# Patient Record
Sex: FEMALE | Race: WHITE | Employment: STUDENT | URBAN - METROPOLITAN AREA
[De-identification: names, ages, dates, MRNs, and addresses within clinical notes are randomized per-mention and may not be internally consistent; named-entity substitution may affect disease eponyms.]

---
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
## 2020-01-29 ENCOUNTER — TELEPHONE (OUTPATIENT)
Dept: GASTROENTEROLOGY | Facility: CLINIC | Age: 20
End: 2020-01-29

## 2020-01-30 ENCOUNTER — TELEPHONE (OUTPATIENT)
Dept: GASTROENTEROLOGY | Facility: CLINIC | Age: 20
End: 2020-01-30

## 2020-01-30 RX ORDER — AMOXICILLIN 250 MG/5ML
750 POWDER, FOR SUSPENSION ORAL 4 TIMES DAILY
Qty: 460 ML | Refills: 0 | Status: SHIPPED | OUTPATIENT
Start: 2020-01-30 | End: 2020-06-17

## 2020-01-30 RX ORDER — LANSOPRAZOLE 30 MG/1
30 CAPSULE, DELAYED RELEASE ORAL 2 TIMES DAILY
Qty: 10 CAPSULE | Refills: 0 | Status: CANCELLED | OUTPATIENT
Start: 2020-01-30

## 2020-01-30 RX ORDER — AMOXICILLIN 250 MG/5ML
750 POWDER, FOR SUSPENSION ORAL 4 TIMES DAILY
Qty: 460 ML | Refills: 0 | Status: CANCELLED | OUTPATIENT
Start: 2020-01-30

## 2020-01-30 RX ORDER — LANSOPRAZOLE 30 MG/1
30 CAPSULE, DELAYED RELEASE ORAL 2 TIMES DAILY
Qty: 28 CAPSULE | Refills: 0 | Status: SHIPPED | OUTPATIENT
Start: 2020-01-30 | End: 2020-06-17

## 2020-01-30 NOTE — TELEPHONE ENCOUNTER
Spoke with pt. Amoxicillin/lansoprazole therapy elected to treat h. Pylori due to side effects of rifabutin.   She states she took the amoxicillin for 2 days but ended up being treated for a kidney infection with different antibiotics and was told to hold

## 2020-01-30 NOTE — TELEPHONE ENCOUNTER
Nursing: Just to clarify the medication regimen. I see the Dr. So  prescribed lansoprazole 30 mg twice daily and amoxicillin 750 mg 4 times daily in suspension form. Did the patient stop both medications? They need to be taken together.   So if

## 2020-01-30 NOTE — TELEPHONE ENCOUNTER
Spoke with the patient. She confirmed that she stopped both lansoprazole and amoxicillin as written below after two days of therapy. Has not restarted either medication.      Requesting new prescriptions for both medications sent to pharmacy listed in chart

## 2020-03-19 ENCOUNTER — TELEPHONE (OUTPATIENT)
Dept: GASTROENTEROLOGY | Facility: CLINIC | Age: 20
End: 2020-03-19

## 2020-03-25 ENCOUNTER — APPOINTMENT (OUTPATIENT)
Dept: LAB | Facility: HOSPITAL | Age: 20
End: 2020-03-25
Attending: INTERNAL MEDICINE
Payer: MEDICAID

## 2020-03-25 DIAGNOSIS — A04.8 H. PYLORI INFECTION: ICD-10-CM

## 2020-03-25 PROCEDURE — 83013 H PYLORI (C-13) BREATH: CPT

## 2020-03-27 LAB — H. PYLORI BREATH TEST: POSITIVE

## 2020-05-11 ENCOUNTER — PATIENT MESSAGE (OUTPATIENT)
Dept: GASTROENTEROLOGY | Facility: CLINIC | Age: 20
End: 2020-05-11

## 2020-05-13 ENCOUNTER — TELEPHONE (OUTPATIENT)
Dept: GASTROENTEROLOGY | Facility: CLINIC | Age: 20
End: 2020-05-13

## 2020-05-13 DIAGNOSIS — R10.13 DYSPEPSIA: Primary | ICD-10-CM

## 2020-05-13 DIAGNOSIS — R11.10 VOMITING, INTRACTABILITY OF VOMITING NOT SPECIFIED, PRESENCE OF NAUSEA NOT SPECIFIED, UNSPECIFIED VOMITING TYPE: ICD-10-CM

## 2020-05-13 DIAGNOSIS — Z86.19 HISTORY OF HELICOBACTER PYLORI INFECTION: ICD-10-CM

## 2020-05-13 NOTE — TELEPHONE ENCOUNTER
May 12, 2020              3:51 PM   Heather Beaver MD routed this conversation to [facility] Clinical Staff   Heather Beaver MD           3:51 PM   Note      You may arrange an endoscopy for refractory dyspepsia, vomiting and H. pylori infection. this is Radames Mortimer i hope your family and yourself doing well and staying safe during these times. i was just contacting you to see if there was any update on when the scope would be able to be scheduled.  my throwing up has increased a bit and that might have

## 2020-05-13 NOTE — TELEPHONE ENCOUNTER
Will route to Gi Schedulers : see below order     \"You may arrange an endoscopy for refractory dyspepsia, vomiting and H. pylori infection. Monitored anesthesia care. \"    Thank you

## 2020-05-15 NOTE — TELEPHONE ENCOUNTER
JERSONM to schedule procedure. [facility] ext [phone] for scheduling.

## 2020-05-16 NOTE — TELEPHONE ENCOUNTER
Scheduled for:  EGD 37882  Provider Name:  Dr. Ene Riddle  Date:  5/19/20  Location:    [facility]  Sedation:  MAC  Time:  1200 (pt is aware to arrive at 1100)   Prep:  NPO after midnight, sent via Mychart  Meds/Allergies Reconciled?:  Physician reviewed   Diag

## 2020-05-19 ENCOUNTER — HOSPITAL ENCOUNTER (OUTPATIENT)
Facility: HOSPITAL | Age: 20
Setting detail: HOSPITAL OUTPATIENT SURGERY
Discharge: HOME | End: 2020-05-19
Attending: INTERNAL MEDICINE | Admitting: INTERNAL MEDICINE
Payer: MEDICAID

## 2020-05-19 ENCOUNTER — ANESTHESIA EVENT (OUTPATIENT)
Dept: ENDOSCOPY | Facility: HOSPITAL | Age: 20
End: 2020-05-19
Payer: MEDICAID

## 2020-05-19 ENCOUNTER — ANESTHESIA (OUTPATIENT)
Dept: ENDOSCOPY | Facility: HOSPITAL | Age: 20
End: 2020-05-19
Payer: MEDICAID

## 2020-05-19 DIAGNOSIS — Z86.19 HISTORY OF HELICOBACTER PYLORI INFECTION: ICD-10-CM

## 2020-05-19 DIAGNOSIS — R11.10 VOMITING, INTRACTABILITY OF VOMITING NOT SPECIFIED, PRESENCE OF NAUSEA NOT SPECIFIED, UNSPECIFIED VOMITING TYPE: ICD-10-CM

## 2020-05-19 DIAGNOSIS — R10.13 DYSPEPSIA: ICD-10-CM

## 2020-05-19 PROCEDURE — 0DB68ZX EXCISION OF STOMACH, VIA NATURAL OR ARTIFICIAL OPENING ENDOSCOPIC, DIAGNOSTIC: ICD-10-PCS | Performed by: INTERNAL MEDICINE

## 2020-05-19 PROCEDURE — 43239 EGD BIOPSY SINGLE/MULTIPLE: CPT | Performed by: INTERNAL MEDICINE

## 2020-05-19 RX ORDER — NALOXONE HYDROCHLORIDE 0.4 MG/ML
80 INJECTION, SOLUTION INTRAMUSCULAR; INTRAVENOUS; SUBCUTANEOUS AS NEEDED
Status: DISCONTINUED | OUTPATIENT
Start: 2020-05-19 | End: 2020-05-19

## 2020-05-19 RX ORDER — SODIUM CHLORIDE, SODIUM LACTATE, POTASSIUM CHLORIDE, CALCIUM CHLORIDE 600; 310; 30; 20 MG/100ML; MG/100ML; MG/100ML; MG/100ML
INJECTION, SOLUTION INTRAVENOUS CONTINUOUS
Status: DISCONTINUED | OUTPATIENT
Start: 2020-05-19 | End: 2020-05-19

## 2020-05-19 RX ADMIN — SODIUM CHLORIDE, SODIUM LACTATE, POTASSIUM CHLORIDE, CALCIUM CHLORIDE: 600; 310; 30; 20 INJECTION, SOLUTION INTRAVENOUS at 14:19:00

## 2020-05-19 NOTE — OPERATIVE REPORT
[facility] Endoscopy Report      Date of Procedure:  05/19/20        Preoperative Diagnosis:  1. Recurrent vomiting  2.   Recalcitrant H. pylori infection post multiple antibiotic courses      Postoperative Diagnosis:  Normal examination of taken of the gastric antrum and gastric body and placed in separate containers. The duodenal bulb and post bulbar region were normal.      Impression:  Normal examination of the upper digestive tract    Recommendations:  1.   Continue symptomatic treatment

## 2020-05-19 NOTE — ANESTHESIA POSTPROCEDURE EVALUATION
Patient: Crystal León    Procedure Summary     Date:  05/19/20 Room / Location:  [facility] ENDOSCOPY 04 / [facility] ENDOSCOPY    Anesthesia Start:  9619 Anesthesia Stop:      Procedure:  ESOPHAGOGASTRODUODENOSCOPY (EGD) (N/A ) Diagnosis:       Dyspepsia      Vomi

## 2020-05-19 NOTE — H&P
History & Physical Examination    Patient Name: Dylan Mccullough  MRN: [id number]  CSN: [id number]  YOB: 2000    Diagnosis: Recurrent vomiting and recalcitrant H. Pylori infection post multiple antibiotic treatment courses.       lansoprazole use: Yes      Frequency: 2-4 times a month      SYSTEM Check if Review is Normal Check if Physical Exam is Normal If not normal, please explain:   RICHARD Adela.Blowers ] [ Jace Flowers  Adela.Blowers ] [ Buffy Lee Adela.Blowers ] [ Jose Sierra Adela.Blowers ] [ Scot Anderson Adela.Blowers ] [ Debby Pierre

## 2020-05-19 NOTE — ANESTHESIA PREPROCEDURE EVALUATION
Anesthesia PreOp Note    HPI:     Channing Liu is a 23year old female who presents for preoperative consultation requested by: Cornel Mancini MD    Date of Surgery: 5/19/2020    Procedure(s):  ESOPHAGOGASTRODUODENOSCOPY (EGD)  Indication: Dysp mg total) by mouth every 8 (eight) hours as needed for Pain., Disp: 20 tablet, Rfl: 0, Taking      lactated ringers infusion, , Intravenous, Continuous, Antonio An MD    No current [facility]-ordered outpatient medications on file.       No Known Aller labs reviewed. Lab Results   Component Value Date    URINEPREG Negative 05/19/2020             Vital Signs: Body mass index is 24.04 kg/m². height is 1.473 m (4' 10\") and weight is 52.2 kg (115 lb). Her temperature is 97.7 °F (36.5 °C).  Her blood pres

## 2020-05-19 NOTE — OR NURSING
Gastric biopsies in a sterile cup with normal saline for H.pylori culture and sensitivity is a Send Out to [facility]. Specimen was hand delivered to [facility] and Dr Fredy Garcia for processing.

## 2020-05-20 VITALS
DIASTOLIC BLOOD PRESSURE: 79 MMHG | OXYGEN SATURATION: 100 % | SYSTOLIC BLOOD PRESSURE: 112 MMHG | HEART RATE: 57 BPM | WEIGHT: 115 LBS | RESPIRATION RATE: 22 BRPM | BODY MASS INDEX: 24.14 KG/M2 | TEMPERATURE: 98 F | HEIGHT: 58 IN

## 2020-06-16 ENCOUNTER — TELEPHONE (OUTPATIENT)
Dept: FAMILY MEDICINE CLINIC | Facility: CLINIC | Age: 20
End: 2020-06-16

## 2020-06-16 NOTE — TELEPHONE ENCOUNTER
Patient calling to make a follow up appointment. Patient states she has H-pylori and anxiety. This has been going on with patient for 10 years. Patient states she has no new symptoms, had a scope last month with Dr. Aimee Severino.  Follow up appointment mad

## 2020-06-17 NOTE — PROGRESS NOTES
HPI    Pt presents with mother for anxiety. Has had h pylorin infections that keep reoccurring for the past 10 years-is being treated by Dr Sánchez Angelo. Has long history of nausea with vomiting. Lives with sister in an apartment for the past 3 years.  Sis 06/17/20  1606   BP: 127/81   Pulse: 90   Weight: 112 lb (50.8 kg)   Height: 4' 10\" (1.473 m)     Body mass index is 23.41 kg/m².     HPV Vaccines(1 - Female 2-dose series) due on 09/02/2011  Annual Physical due on 10/10/2019  Annual Depression Screen due Talks on phone: Not on file        Gets together: Not on file        Attends Buddhist service: Not on file        Active member of club or organization: Not on file        Attends meetings of clubs or organizations: Not on file        Relationship status: agrees to start with low dose of buspar-advised how to adjust . Will use 7.5 mg bars due to pt's difficulty in swallowing pills. Refer behavioral health not only for anxiety but also for stress related to chronic vomiting.    Pt advised to call if any que

## 2020-06-18 PROBLEM — F50.89 PSYCHOGENIC VOMITING WITH NAUSEA: Status: ACTIVE | Noted: 2020-06-18

## 2020-06-18 PROBLEM — F41.8 ANXIETY ASSOCIATED WITH DEPRESSION: Status: ACTIVE | Noted: 2020-06-18

## 2020-06-18 PROBLEM — F41.9 ANXIETY: Status: ACTIVE | Noted: 2020-06-18

## 2020-06-19 NOTE — ASSESSMENT & PLAN NOTE
45 min spent in visit discussing symptoms, multifaceted approach needed to treat anxiety.    Mother very vocal in dismissing supportive care such as diet, relaxation techniques, yoga etc  Pharmaceutical options discussed-advised that the daily use of benzos

## 2020-09-03 ENCOUNTER — OFFICE VISIT (OUTPATIENT)
Dept: FAMILY MEDICINE CLINIC | Facility: CLINIC | Age: 20
End: 2020-09-03
Payer: MEDICAID

## 2020-09-03 ENCOUNTER — TELEPHONE (OUTPATIENT)
Dept: FAMILY MEDICINE CLINIC | Facility: CLINIC | Age: 20
End: 2020-09-03

## 2020-09-03 VITALS
HEIGHT: 58 IN | SYSTOLIC BLOOD PRESSURE: 116 MMHG | WEIGHT: 108 LBS | TEMPERATURE: 99 F | HEART RATE: 79 BPM | DIASTOLIC BLOOD PRESSURE: 80 MMHG | BODY MASS INDEX: 22.67 KG/M2

## 2020-09-03 DIAGNOSIS — N30.01 ACUTE CYSTITIS WITH HEMATURIA: ICD-10-CM

## 2020-09-03 DIAGNOSIS — R30.0 DYSURIA: Primary | ICD-10-CM

## 2020-09-03 DIAGNOSIS — R35.0 URINARY FREQUENCY: ICD-10-CM

## 2020-09-03 LAB
GLUCOSE (URINE DIPSTICK): NEGATIVE MG/DL
KETONES (URINE DIPSTICK): 160 MG/DL
LEUKOCYTES: NEGATIVE
MULTISTIX LOT#: 1044 NUMERIC
NITRITE, URINE: POSITIVE
PH, URINE: 5.5 (ref 4.5–8)
PROTEIN (URINE DIPSTICK): 300 MG/DL
SPECIFIC GRAVITY: 1.03 (ref 1–1.03)
UROBILINOGEN,SEMI-QN: 1 MG/DL (ref 0–1.9)

## 2020-09-03 PROCEDURE — 81003 URINALYSIS AUTO W/O SCOPE: CPT | Performed by: NURSE PRACTITIONER

## 2020-09-03 PROCEDURE — 3074F SYST BP LT 130 MM HG: CPT | Performed by: NURSE PRACTITIONER

## 2020-09-03 PROCEDURE — 3008F BODY MASS INDEX DOCD: CPT | Performed by: NURSE PRACTITIONER

## 2020-09-03 PROCEDURE — 99213 OFFICE O/P EST LOW 20 MIN: CPT | Performed by: NURSE PRACTITIONER

## 2020-09-03 PROCEDURE — 3079F DIAST BP 80-89 MM HG: CPT | Performed by: NURSE PRACTITIONER

## 2020-09-03 RX ORDER — PHENAZOPYRIDINE HYDROCHLORIDE 100 MG/1
100 TABLET, FILM COATED ORAL 3 TIMES DAILY PRN
Qty: 6 TABLET | Refills: 0 | Status: SHIPPED | OUTPATIENT
Start: 2020-09-03 | End: 2020-09-03 | Stop reason: CLARIF

## 2020-09-03 RX ORDER — PHENAZOPYRIDINE HYDROCHLORIDE 100 MG/1
100 TABLET, FILM COATED ORAL 3 TIMES DAILY PRN
Qty: 6 TABLET | Refills: 0 | Status: SHIPPED | OUTPATIENT
Start: 2020-09-03

## 2020-09-03 RX ORDER — NITROFURANTOIN 25; 75 MG/1; MG/1
100 CAPSULE ORAL 2 TIMES DAILY
Qty: 14 CAPSULE | Refills: 0 | Status: SHIPPED | OUTPATIENT
Start: 2020-09-03

## 2020-09-03 NOTE — PROGRESS NOTES
HPI    Patient presents for urinary frequency and dysuria that has been present for 4-5 days. Denies flank pain and fever. Review of Systems   Genitourinary: Positive for dysuria and frequency. All other systems reviewed and are negative.        09/0 use: Yes        Types: Cannabis        Comment: 3-4times week      Sexual activity: Not on file    Lifestyle      Physical activity:        Days per week: Not on file        Minutes per session: Not on file      Stress: Not on file    Relationships      So She has a normal mood and affect.  Her behavior is normal. Judgment and thought content normal.       Assessment and Plan:  Problem List Items Addressed This Visit     None      Visit Diagnoses     Dysuria    -  Primary    Relevant Orders    URINALYSIS, AUT

## 2020-09-03 NOTE — TELEPHONE ENCOUNTER
Office Visit     9/3/2020  [facility], [facility], [address], [place]   Nurse Practitioner   Dysuria +2 more   Dx   Urinary Frequency   , Urinary     Reason for Visit

## 2020-09-03 NOTE — TELEPHONE ENCOUNTER
Spoke with patient ( verified)--made appt today with Aubrey All for UTI symptoms since /Monday. Patient just got her period after getting off phone with [facility]--concerned that menses will affect her urine sample results.  Patient reports hospitalizati

## 2021-04-02 NOTE — TELEPHONE ENCOUNTER
Dr. Heber Castro    Patient asking when she could have scope scheduled because her symptoms have worsened a bit. Would you like her to be scheduled for an endoscopy or would you like me to help her arrange a follow up appointment.   If so would you prefer
From: Cristy Cazares  To: Adrianna Clements MD  Sent: 5/11/2020 4:34 PM CDT  Subject: Referral Request    alberto [place]  this is Kaushik Francisco i hope your family and yourself doing well and staying safe during these times.  i was just contacting you to
You may arrange an endoscopy for refractory dyspepsia, vomiting and H. pylori infection. Monitored anesthesia care.
no

## (undated) DEVICE — Device: Brand: CUSTOM PROCEDURE KIT

## (undated) DEVICE — FORCEP RADIAL JAW 4

## (undated) DEVICE — CONMED SCOPE SAVER BITE BLOCK, 20X27 MM: Brand: SCOPE SAVER

## (undated) DEVICE — LINE MNTR ADLT SET O2 INTMD

## (undated) DEVICE — 35 ML SYRINGE REGULAR TIP: Brand: MONOJECT

## (undated) DEVICE — 6 ML SYRINGE LUER-LOCK TIP: Brand: MONOJECT

## (undated) DEVICE — MEDI-VAC NON-CONDUCTIVE SUCTION TUBING 6MM X 1.8M (6FT.) L: Brand: CARDINAL HEALTH

## (undated) DEVICE — 3 ML SYRINGE LUER-LOCK TIP: Brand: MONOJECT

## (undated) DEVICE — Device: Brand: DEFENDO AIR/WATER/SUCTION AND BIOPSY VALVE

## (undated) NOTE — LETTER
2/2/2019          To Whom It May Concern:    Kaylan Ruiz is currently under my medical care and may not return to gym for 4 weeks. If you require additional information please contact our office.         Sincerely,    Neeta Alexandra DPM

## (undated) NOTE — ED AVS SNAPSHOT
Brittany Sykes   MRN: [id number]    Department:  [facility] Emergency Department   Date of Visit:  6/21/2018           Disclosure     Insurance plans vary and the physician(s) referred by the ER may not be covered by your plan.  Please contact CARE PHYSICIAN AT ONCE OR RETURN IMMEDIATELY TO THE EMERGENCY DEPARTMENT. If you have been prescribed any medication(s), please fill your prescription right away and begin taking the medication(s) as directed.   If you believe that any of the medications

## (undated) NOTE — LETTER
9/7/2018          To Whom It May Concern:    Gudelia Hernandez is currently under my medical care. Madhuri Mejia was in my office today for left ankle injury. Please excuse Madhuri Mejia from gym/physical activities for 1 month.     If you require additional informat

## (undated) NOTE — LETTER
8/20/2018        To Whom It May Concern:    Brittany Sykes is currently under my medical care and may not return to school at this time. Please excuse Soo Diaz for 2 days. She may return to school on 08/22/18. Activity is restricted as follows: none.

## (undated) NOTE — LETTER
3/6/2019          To Whom It May Concern:    Jose Carrizales is currently under my medical care and may return to gym and running gradually for the next 3 weeks - half walking and half running. After that she may return to gym with no restrictions.     If y

## (undated) NOTE — LETTER
10/18/2018              Zonia Hernandez        [address]        [facility] [address]           To Whom It May Concern: The above patient is under my care. She is being evaluated for a chronic history of abdominal pain and vomiting.     [place] [id number]

## (undated) NOTE — LETTER
12/5/2018          To Whom It May Concern:    Pancho Kim is currently under my medical care           Sincerely,    Rene Beck, DPM          Document generated by:  Danyel Collins

## (undated) NOTE — LETTER
03/19/20        [facility] [place] [address]      Dear Gerson Means records indicate that you have outstanding lab work and or testing that was ordered for you and has not yet been completed:   H.Pylori  To provide you with the b

## (undated) NOTE — LETTER
12/5/2018          To Whom It May Concern:    Bisi Hamilton is currently under my medical care.   Because the patient is still experiencing pain with trying light jog or run and because she cannot balance on one foot or hop on one foot specifically the le

## (undated) NOTE — LETTER
Dear Dr. Shravan Coulter    This letter is to inform you that Kaylan Ruiz has been attending Physical Therapy with me. See below for my most recent plan of care.        Patient Name: Kaylan Ruiz : 2000, MRN: [id number]   Date:  2019  Referring

## (undated) NOTE — LETTER
5/16/2018          To Whom It May Concern:    Nestor Box is currently under my medical care and may not participate in any sports using her hands due to acute finger injury until 5/25/2018.      If you require additional information please contact our

## (undated) NOTE — LETTER
8/14/2018          To Whom It May Concern:    Anastasia Carrion is currently under my medical care. Please excuse the patient from gym class until further notice as she has had a ankle injury.       If you require additional information please contact our of